# Patient Record
Sex: MALE | Race: WHITE | NOT HISPANIC OR LATINO | Employment: OTHER | URBAN - METROPOLITAN AREA
[De-identification: names, ages, dates, MRNs, and addresses within clinical notes are randomized per-mention and may not be internally consistent; named-entity substitution may affect disease eponyms.]

---

## 2017-02-02 DIAGNOSIS — I21.3 ST ELEVATION MYOCARDIAL INFARCTION (STEMI), UNSPECIFIED ARTERY (HCC): ICD-10-CM

## 2017-02-03 RX ORDER — CLOPIDOGREL BISULFATE 75 MG/1
75 TABLET ORAL DAILY
Qty: 90 TAB | Refills: 3 | Status: SHIPPED | OUTPATIENT
Start: 2017-02-03

## 2017-03-20 ENCOUNTER — OFFICE VISIT (OUTPATIENT)
Dept: CARDIOLOGY | Facility: MEDICAL CENTER | Age: 82
End: 2017-03-20
Payer: MEDICARE

## 2017-03-20 VITALS
OXYGEN SATURATION: 98 % | BODY MASS INDEX: 23.04 KG/M2 | HEIGHT: 68 IN | HEART RATE: 60 BPM | WEIGHT: 152 LBS | DIASTOLIC BLOOD PRESSURE: 56 MMHG | SYSTOLIC BLOOD PRESSURE: 98 MMHG

## 2017-03-20 DIAGNOSIS — I25.10 CORONARY ARTERY DISEASE INVOLVING NATIVE CORONARY ARTERY OF NATIVE HEART WITHOUT ANGINA PECTORIS: Primary | ICD-10-CM

## 2017-03-20 DIAGNOSIS — E78.2 MIXED HYPERLIPIDEMIA: ICD-10-CM

## 2017-03-20 DIAGNOSIS — R48.1 LOSS OF PERCEPTION FOR TASTE: ICD-10-CM

## 2017-03-20 DIAGNOSIS — N18.4 CHRONIC RENAL INSUFFICIENCY, STAGE 4 (SEVERE) (HCC): ICD-10-CM

## 2017-03-20 DIAGNOSIS — I21.02 ST ELEVATION MYOCARDIAL INFARCTION INVOLVING LEFT ANTERIOR DESCENDING (LAD) CORONARY ARTERY (HCC): ICD-10-CM

## 2017-03-20 PROCEDURE — G8484 FLU IMMUNIZE NO ADMIN: HCPCS | Performed by: NURSE PRACTITIONER

## 2017-03-20 PROCEDURE — 1101F PT FALLS ASSESS-DOCD LE1/YR: CPT | Mod: 8P | Performed by: NURSE PRACTITIONER

## 2017-03-20 PROCEDURE — 1036F TOBACCO NON-USER: CPT | Performed by: NURSE PRACTITIONER

## 2017-03-20 PROCEDURE — G8432 DEP SCR NOT DOC, RNG: HCPCS | Performed by: NURSE PRACTITIONER

## 2017-03-20 PROCEDURE — G8598 ASA/ANTIPLAT THER USED: HCPCS | Performed by: NURSE PRACTITIONER

## 2017-03-20 PROCEDURE — G8420 CALC BMI NORM PARAMETERS: HCPCS | Performed by: NURSE PRACTITIONER

## 2017-03-20 PROCEDURE — 99214 OFFICE O/P EST MOD 30 MIN: CPT | Performed by: NURSE PRACTITIONER

## 2017-03-20 PROCEDURE — 4040F PNEUMOC VAC/ADMIN/RCVD: CPT | Performed by: NURSE PRACTITIONER

## 2017-03-20 RX ORDER — CARVEDILOL 3.12 MG/1
3.12 TABLET ORAL 2 TIMES DAILY WITH MEALS
Qty: 180 TAB | Refills: 3 | Status: SHIPPED | OUTPATIENT
Start: 2017-03-20

## 2017-03-20 RX ORDER — ATORVASTATIN CALCIUM 80 MG/1
80 TABLET, FILM COATED ORAL EVERY EVENING
Qty: 90 TAB | Refills: 3 | Status: SHIPPED | OUTPATIENT
Start: 2017-03-20

## 2017-03-20 ASSESSMENT — ENCOUNTER SYMPTOMS
PND: 0
MYALGIAS: 0
COUGH: 0
ABDOMINAL PAIN: 0
CLAUDICATION: 0
PALPITATIONS: 0
DIZZINESS: 1
ORTHOPNEA: 0
SHORTNESS OF BREATH: 0
WEAKNESS: 0

## 2017-03-20 NOTE — Clinical Note
[facility]   [address], [address]  [place] [address]  Phone: [phone]  Fax: [phone]              Destiney Morin  1935    Encounter Date: 3/20/2017    SIA Benjamin          PROGRESS NOTE:  Subjective:   Destiney Morin is a 81 y.o. male who presents today with his wife, Adriana, to follow-up on coronary artery disease. He had an anterior MI in May 2016 and received a stent to his LAD. He had other diffuse disease and did not undergo any further interventions. Initially his ejection fraction was low at 25% but increased to 50% per his last echo.    He states he feels generally well. He walks the dog almost daily and tolerates it with out any chest discomfort. They are moving to [place] in 2 weeks and he has been walking up and down steps and carrying boxes without any angina symptoms. He denies shortness of breath or ankle edema. He complains of right sided chest pain when sitting in chair. He points to the right pectoralis area.    He has a single kidney and chronic kidney disease. He is taking diclofenac on a daily basis for arthritis.    He complains of loss of taste ever since having his stent last year. He was given Flonase and hopes that it would help but it did not work therefore he discontinued it.    Past Medical History   Diagnosis Date   • Arthritis      hands   • Cancer (CMS-[facility]) 1999     prostate   • Renal disorder      growth on right kidney   • Heart burn    • Indigestion    • Shingles      Past Surgical History   Procedure Laterality Date   • Other       brachytherapy to prostate   • Inguinal hernia repair       right   • Hip arthroplasty total       right   • Pr total hip arthroplasty       left   • Nephrectomy radical  3/14/2012     Performed by AHSAN MALONE at SURGERY [facility] ORS   • Cardiac cath  5/2/16     Stent to LAD     Family History   Problem Relation Age of Onset   • Family history unknown: Yes          History   Smoking status   • Never Smoker    Smokeless tobacco   • Never Used     Allergies   Allergen Reactions   • Iodine Rash     Rash (topical)   • Pcn [Penicillins] Itching     itch   • Tape      Plastic surgical tape, (paper okay) tegaderm unknown     Outpatient Encounter Prescriptions as of 3/20/2017   Medication Sig Dispense Refill   • aspirin EC (ECOTRIN) 81 MG Tablet Delayed Response Take 81 mg by mouth every day.     • atorvastatin (LIPITOR) 80 MG tablet Take 1 Tab by mouth every evening. 90 Tab 3   • carvedilol (COREG) 3.125 MG Tab Take 1 Tab by mouth 2 times a day, with meals. 180 Tab 3   • clopidogrel (PLAVIX) 75 MG Tab Take 1 Tab by mouth every day. 90 Tab 3   • diclofenac EC (VOLTAREN) 50 MG Tablet Delayed Response Take 50 mg by mouth every day.     • paroxetine (PAXIL) 10 MG Tab Take 10 mg by mouth every day.     • ranitidine (ZANTAC) 150 MG Tab Take 150 mg by mouth 2 times a day.     • Multiple Vitamins-Minerals (PRESERVISION AREDS 2) Cap Take 2 Caps by mouth every day.     • [DISCONTINUED] atorvastatin (LIPITOR) 80 MG tablet Take 1 Tab by mouth every bedtime. 30 Tab 11   • [DISCONTINUED] carvedilol (COREG) 3.125 MG Tab Take 1 Tab by mouth 2 times a day, with meals. 60 Tab 11   • [DISCONTINUED] lisinopril (PRINIVIL) 5 MG Tab Take 5 mg by mouth every day.     • [DISCONTINUED] aspirin  MG Tablet Delayed Response Take 1 Tab by mouth every day. (Patient not taking: Reported on 3/20/2017) 60 Tab 1   • [DISCONTINUED] furosemide (LASIX) 20 MG Tab Take 1 Tab by mouth every day. (Patient not taking: Reported on 3/20/2017) 60 Tab 1   • [DISCONTINUED] fluticasone (FLONASE) 50 MCG/ACT nasal spray Spray 2 Sprays in nose 2 times a day.       No facility-administered encounter medications on file as of 3/20/2017.     Review of Systems   Constitutional: Negative for malaise/fatigue.   Respiratory: Negative for cough and shortness of breath.    Cardiovascular: Positive for chest pain (right upper chest  "tightness with sitting in chair.). Negative for palpitations, orthopnea, claudication, leg swelling and PND.   Gastrointestinal: Negative for abdominal pain.   Musculoskeletal: Negative for myalgias.   Neurological: Positive for dizziness (with standing up fast.). Negative for weakness.        Objective:   BP 98/56 mmHg  Pulse 60  Ht 1.727 m (5' 7.99\")  Wt 68.947 kg (152 lb)  BMI 23.12 kg/m2  SpO2 98%    Physical Exam   Constitutional: He is oriented to person, place, and time. He appears well-developed and well-nourished.   HENT:   Head: Normocephalic.   Eyes: Conjunctivae are normal.   Neck: No JVD present. No thyromegaly present.   Cardiovascular: Normal rate, regular rhythm and normal heart sounds.    Pulmonary/Chest: Effort normal and breath sounds normal. He has no wheezes. He has no rales. He exhibits tenderness (over the right pectoralis muscle. Palpation reproduces his pain.).   Abdominal: Soft. Bowel sounds are normal. He exhibits no distension. There is no tenderness.   Musculoskeletal: He exhibits no edema.   Neurological: He is alert and oriented to person, place, and time.   Skin: Skin is warm and dry.   Psychiatric: He has a normal mood and affect.     No recent lab.    Assessment:     1. Coronary artery disease involving native coronary artery of native heart without angina pectoris     2. ST elevation myocardial infarction involving left anterior descending (LAD) coronary artery (CMS-HCC)  carvedilol (COREG) 3.125 MG Tab   3. Chronic renal insufficiency, stage 4 (severe) (CMS-HCC)     4. Mixed hyperlipidemia  COMP METABOLIC PANEL    LIPID PROFILE    atorvastatin (LIPITOR) 80 MG tablet   5. Loss of perception for taste         Medical Decision Making:  Today's Assessment / Status / Plan:     Coronary artery disease: Status post stent to LAD in 2016. He has no anginal symptoms. His right upper chest discomfort is clearly muscular and occurs at rest. He may discontinue Plavix after one year. His " blood pressure is low and he does experience some lightheadedness with position change. We will have him discontinue lisinopril and monitor his blood pressure. If his blood pressure is greater than 150 consistently he may restart the lisinopril.    Chronic renal insufficiency: I have counseled him to consider discontinuing diclofenac since it is an NSAID and could be damaging his solitary kidney. I recommend Tylenol 500 mg 2 tablets every 6 hours as needed for pain or arthritis. He does not drink alcohol therefore full doses of Tylenol would be acceptable.    Hyperlipidemia: I have no lipids in the chart. He will do lipid and metabolic panel prior to moving to [place].    Loss of perception of taste: Once he gets settled in [place] he will see an ENT for further evaluation.    He and his wife are moving to [facility] in approximately 2 weeks. They will establish care there. They're always welcome to return to our practice as needed.    Collaborating Provider: Dr. Rene Maria.        No Recipients

## 2017-03-20 NOTE — MR AVS SNAPSHOT
"        Destiney Morin   3/20/2017 12:40 PM   Office Visit   MRN: [id number]    Department:  [facility]   Dept Phone:  [phone]    Description:  Male : 1935   Provider:  SIA Benjamin           Allergies as of 3/20/2017     Allergen Noted Reactions    Iodine 2012   Rash    Rash (topical)    Pcn [Penicillins] 2012   Itching    itch    Tape 2012       Plastic surgical tape, (paper okay) tegaderm unknown      You were diagnosed with     Coronary artery disease involving native coronary artery of native heart without angina pectoris   [5462562]  -  Primary     ST elevation myocardial infarction involving left anterior descending (LAD) coronary artery (CMS-HCC)   [8342185]       Chronic renal insufficiency, stage 4 (severe) (CMS-HCC)   [3530138]       Mixed hyperlipidemia   [272.2.ICD-9-CM]       Loss of perception for taste   [381073]         Vital Signs     Blood Pressure Pulse Height Weight Body Mass Index Oxygen Saturation    98/56 mmHg 60 1.727 m (5' 7.99\") 68.947 kg (152 lb) 23.12 kg/m2 98%    Smoking Status                   Never Smoker            Basic Information     Date Of Birth Sex Race Ethnicity Preferred Language    1935 Male White Non- English      Problem List              ICD-10-CM Priority Class Noted - Resolved    Nonspecific abnormal electrocardiogram (ECG) (EKG) R94.31   2012 - Present    Renal cancer (CMS-HCC) C64.9   2012 - Present    Kidney stone N20.0   2012 - Present    Preop cardiovascular exam Z01.810   2012 - Present    STEMI (ST elevation myocardial infarction) (CMS-HCC) I21.3   2016 - Present    Single kidney Z90.5   5/3/2016 - Present    Chronic renal insufficiency N18.9   5/3/2016 - Present    Mixed hyperlipidemia E78.2   2016 - Present    Acute on chronic systolic congestive heart failure (CMS-HCC) I50.23   2016 - Present    Loss of perception for taste R48.1   3/20/2017 - Present      Health " Maintenance        Date Due Completion Dates    IMM DTaP/Tdap/Td Vaccine (1 - Tdap) 5/17/1955 ---    COLONOSCOPY 5/17/1986 ---    IMM ZOSTER VACCINE 5/17/1996 ---    IMM PNEUMOCOCCAL 65+ (ADULT) LOW/MEDIUM RISK SERIES (2 of 2 - PCV13) 3/15/2004 3/15/2003    IMM INFLUENZA (1) 9/1/2016 10/15/2011            Current Immunizations     Influenza TIV (IM) 10/15/2011    Pneumococcal polysaccharide vaccine (PPSV-23) 3/15/2003      Below and/or attached are the medications your provider expects you to take. Review all of your home medications and newly ordered medications with your provider and/or pharmacist. Follow medication instructions as directed by your provider and/or pharmacist. Please keep your medication list with you and share with your provider. Update the information when medications are discontinued, doses are changed, or new medications (including over-the-counter products) are added; and carry medication information at all times in the event of emergency situations     Allergies:  IODINE - Rash     PCN - Itching     TAPE - (reactions not documented)               Medications  Valid as of: March 20, 2017 -  1:34 PM    Generic Name Brand Name Tablet Size Instructions for use    Aspirin (Tablet Delayed Response) ECOTRIN 81 MG Take 81 mg by mouth every day.        Atorvastatin Calcium (Tab) LIPITOR 80 MG Take 1 Tab by mouth every evening.        Carvedilol (Tab) COREG 3.125 MG Take 1 Tab by mouth 2 times a day, with meals.        Clopidogrel Bisulfate (Tab) PLAVIX 75 MG Take 1 Tab by mouth every day.        Diclofenac Sodium (Tablet Delayed Response) VOLTAREN 50 MG Take 50 mg by mouth every day.        Multiple Vitamins-Minerals (Cap) PRESERVISION AREDS 2  Take 2 Caps by mouth every day.        PARoxetine HCl (Tab) PAXIL 10 MG Take 10 mg by mouth every day.        RaNITidine HCl (Tab) ZANTAC 150 MG Take 150 mg by mouth 2 times a day.        .                 Medicines prescribed today were sent to:     [facility] - [place], [place] - [address]    [address] [place] [address]    Phone: [phone] Fax: [phone]    Open 24 Hours?: No      Medication refill instructions:       If your prescription bottle indicates you have medication refills left, it is not necessary to call your provider’s office. Please contact your pharmacy and they will refill your medication.    If your prescription bottle indicates you do not have any refills left, you may request refills at any time through one of the following ways: The online [employer] system (except Urgent Care), by calling your provider’s office, or by asking your pharmacy to contact your provider’s office with a refill request. Medication refills are processed only during regular business hours and may not be available until the next business day. Your provider may request additional information or to have a follow-up visit with you prior to refilling your medication.   *Please Note: Medication refills are assigned a new Rx number when refilled electronically. Your pharmacy may indicate that no refills were authorized even though a new prescription for the same medication is available at the pharmacy. Please request the medicine by name with the pharmacy before contacting your provider for a refill.        Your To Do List     Future Labs/Procedures Complete By Expires    COMP METABOLIC PANEL  As directed 3/20/2018    LIPID PROFILE  As directed 3/20/2018         [employer] Status: Patient Declined

## 2017-03-20 NOTE — PROGRESS NOTES
Subjective:   Destiney Morin is a 81 y.o. male who presents today with his wife, Adriana, to follow-up on coronary artery disease. He had an anterior MI in May 2016 and received a stent to his LAD. He had other diffuse disease and did not undergo any further interventions. Initially his ejection fraction was low at 25% but increased to 50% per his last echo.    He states he feels generally well. He walks the dog almost daily and tolerates it with out any chest discomfort. They are moving to [place] in 2 weeks and he has been walking up and down steps and carrying boxes without any angina symptoms. He denies shortness of breath or ankle edema. He complains of right sided chest pain when sitting in chair. He points to the right pectoralis area.    He has a single kidney and chronic kidney disease. He is taking diclofenac on a daily basis for arthritis.    He complains of loss of taste ever since having his stent last year. He was given Flonase and hopes that it would help but it did not work therefore he discontinued it.    Past Medical History   Diagnosis Date   • Arthritis      hands   • Cancer (CMS-HCC) 1999     prostate   • Renal disorder      growth on right kidney   • Heart burn    • Indigestion    • Shingles      Past Surgical History   Procedure Laterality Date   • Other       brachytherapy to prostate   • Inguinal hernia repair       right   • Hip arthroplasty total       right   • Pr total hip arthroplasty       left   • Nephrectomy radical  3/14/2012     Performed by AHSAN MALONE at SURGERY [facility] ORS   • Cardiac cath  5/2/16     Stent to LAD     Family History   Problem Relation Age of Onset   • Family history unknown: Yes     History   Smoking status   • Never Smoker    Smokeless tobacco   • Never Used     Allergies   Allergen Reactions   • Iodine Rash     Rash (topical)   • Pcn [Penicillins] Itching     itch   • Tape      Plastic surgical tape, (paper okay) tegaderm unknown     Outpatient  Encounter Prescriptions as of 3/20/2017   Medication Sig Dispense Refill   • aspirin EC (ECOTRIN) 81 MG Tablet Delayed Response Take 81 mg by mouth every day.     • atorvastatin (LIPITOR) 80 MG tablet Take 1 Tab by mouth every evening. 90 Tab 3   • carvedilol (COREG) 3.125 MG Tab Take 1 Tab by mouth 2 times a day, with meals. 180 Tab 3   • clopidogrel (PLAVIX) 75 MG Tab Take 1 Tab by mouth every day. 90 Tab 3   • diclofenac EC (VOLTAREN) 50 MG Tablet Delayed Response Take 50 mg by mouth every day.     • paroxetine (PAXIL) 10 MG Tab Take 10 mg by mouth every day.     • ranitidine (ZANTAC) 150 MG Tab Take 150 mg by mouth 2 times a day.     • Multiple Vitamins-Minerals (PRESERVISION AREDS 2) Cap Take 2 Caps by mouth every day.     • [DISCONTINUED] atorvastatin (LIPITOR) 80 MG tablet Take 1 Tab by mouth every bedtime. 30 Tab 11   • [DISCONTINUED] carvedilol (COREG) 3.125 MG Tab Take 1 Tab by mouth 2 times a day, with meals. 60 Tab 11   • [DISCONTINUED] lisinopril (PRINIVIL) 5 MG Tab Take 5 mg by mouth every day.     • [DISCONTINUED] aspirin  MG Tablet Delayed Response Take 1 Tab by mouth every day. (Patient not taking: Reported on 3/20/2017) 60 Tab 1   • [DISCONTINUED] furosemide (LASIX) 20 MG Tab Take 1 Tab by mouth every day. (Patient not taking: Reported on 3/20/2017) 60 Tab 1   • [DISCONTINUED] fluticasone (FLONASE) 50 MCG/ACT nasal spray Spray 2 Sprays in nose 2 times a day.       No facility-administered encounter medications on file as of 3/20/2017.     Review of Systems   Constitutional: Negative for malaise/fatigue.   Respiratory: Negative for cough and shortness of breath.    Cardiovascular: Positive for chest pain (right upper chest tightness with sitting in chair.). Negative for palpitations, orthopnea, claudication, leg swelling and PND.   Gastrointestinal: Negative for abdominal pain.   Musculoskeletal: Negative for myalgias.   Neurological: Positive for dizziness (with standing up fast.). Negative  "for weakness.        Objective:   BP 98/56 mmHg  Pulse 60  Ht 1.727 m (5' 7.99\")  Wt 68.947 kg (152 lb)  BMI 23.12 kg/m2  SpO2 98%    Physical Exam   Constitutional: He is oriented to person, place, and time. He appears well-developed and well-nourished.   HENT:   Head: Normocephalic.   Eyes: Conjunctivae are normal.   Neck: No JVD present. No thyromegaly present.   Cardiovascular: Normal rate, regular rhythm and normal heart sounds.    Pulmonary/Chest: Effort normal and breath sounds normal. He has no wheezes. He has no rales. He exhibits tenderness (over the right pectoralis muscle. Palpation reproduces his pain.).   Abdominal: Soft. Bowel sounds are normal. He exhibits no distension. There is no tenderness.   Musculoskeletal: He exhibits no edema.   Neurological: He is alert and oriented to person, place, and time.   Skin: Skin is warm and dry.   Psychiatric: He has a normal mood and affect.     No recent lab.    Assessment:     1. Coronary artery disease involving native coronary artery of native heart without angina pectoris     2. ST elevation myocardial infarction involving left anterior descending (LAD) coronary artery (CMS-[facility])  carvedilol (COREG) 3.125 MG Tab   3. Chronic renal insufficiency, stage 4 (severe) (CMS-[facility])     4. Mixed hyperlipidemia  COMP METABOLIC PANEL    LIPID PROFILE    atorvastatin (LIPITOR) 80 MG tablet   5. Loss of perception for taste         Medical Decision Making:  Today's Assessment / Status / Plan:     Coronary artery disease: Status post stent to LAD in 2016. He has no anginal symptoms. His right upper chest discomfort is clearly muscular and occurs at rest. He may discontinue Plavix after one year. His blood pressure is low and he does experience some lightheadedness with position change. We will have him discontinue lisinopril and monitor his blood pressure. If his blood pressure is greater than 150 consistently he may restart the lisinopril.    Chronic renal " insufficiency: I have counseled him to consider discontinuing diclofenac since it is an NSAID and could be damaging his solitary kidney. I recommend Tylenol 500 mg 2 tablets every 6 hours as needed for pain or arthritis. He does not drink alcohol therefore full doses of Tylenol would be acceptable.    Hyperlipidemia: I have no lipids in the chart. He will do lipid and metabolic panel prior to moving to [place].    Loss of perception of taste: Once he gets settled in [place] he will see an ENT for further evaluation.    He and his wife are moving to [facility] in approximately 2 weeks. They will establish care there. They're always welcome to return to our practice as needed.    Collaborating Provider: Dr. Rene Maria.

## 2017-03-23 ENCOUNTER — TELEPHONE (OUTPATIENT)
Dept: CARDIOLOGY | Facility: MEDICAL CENTER | Age: 82
End: 2017-03-23

## 2017-03-23 NOTE — TELEPHONE ENCOUNTER
----- Message from SIA Benjamin sent at 3/23/2017  1:14 PM PDT -----  Please contact patient. Tell him labs are good. His cholesterol numbers are very low and as long as he is tolerating atorvastatin 80 mg he may continue this dose.